# Patient Record
Sex: MALE | Race: WHITE | Employment: OTHER | URBAN - METROPOLITAN AREA
[De-identification: names, ages, dates, MRNs, and addresses within clinical notes are randomized per-mention and may not be internally consistent; named-entity substitution may affect disease eponyms.]

---

## 2024-08-20 ENCOUNTER — APPOINTMENT (OUTPATIENT)
Dept: GENERAL RADIOLOGY | Age: 69
End: 2024-08-20
Payer: MEDICARE

## 2024-08-20 ENCOUNTER — APPOINTMENT (OUTPATIENT)
Dept: CT IMAGING | Age: 69
End: 2024-08-20
Payer: MEDICARE

## 2024-08-20 ENCOUNTER — APPOINTMENT (OUTPATIENT)
Age: 69
End: 2024-08-20
Attending: STUDENT IN AN ORGANIZED HEALTH CARE EDUCATION/TRAINING PROGRAM
Payer: MEDICARE

## 2024-08-20 ENCOUNTER — HOSPITAL ENCOUNTER (INPATIENT)
Age: 69
LOS: 2 days | Discharge: HOME | End: 2024-08-22
Attending: STUDENT IN AN ORGANIZED HEALTH CARE EDUCATION/TRAINING PROGRAM | Admitting: INTERNAL MEDICINE
Payer: MEDICARE

## 2024-08-20 DIAGNOSIS — I46.9 CARDIAC ARREST (HCC): Primary | ICD-10-CM

## 2024-08-20 DIAGNOSIS — I49.01 VENTRICULAR FIBRILLATION (HCC): ICD-10-CM

## 2024-08-20 DIAGNOSIS — R46.89 COMBATIVE BEHAVIOR: ICD-10-CM

## 2024-08-20 LAB
ALBUMIN SERPL-MCNC: 3.9 G/DL (ref 3.4–5)
ALBUMIN SERPL-MCNC: 4.6 G/DL (ref 3.4–5)
ALBUMIN/GLOB SERPL: 1 {RATIO} (ref 1.1–2.2)
ALBUMIN/GLOB SERPL: 1.1 {RATIO} (ref 1.1–2.2)
ALP SERPL-CCNC: 125 U/L (ref 40–129)
ALP SERPL-CCNC: 154 U/L (ref 40–129)
ALT SERPL-CCNC: 30 U/L (ref 10–40)
ALT SERPL-CCNC: 35 U/L (ref 10–40)
AMPHETAMINES UR QL SCN>1000 NG/ML: ABNORMAL
ANION GAP SERPL CALCULATED.3IONS-SCNC: 17 MMOL/L (ref 3–16)
ANION GAP SERPL CALCULATED.3IONS-SCNC: 37 MMOL/L (ref 3–16)
APTT BLD: 36.1 SEC (ref 22.1–36.4)
AST SERPL-CCNC: 53 U/L (ref 15–37)
AST SERPL-CCNC: 71 U/L (ref 15–37)
BARBITURATES UR QL SCN>200 NG/ML: ABNORMAL
BASE EXCESS BLDA CALC-SCNC: -3 MMOL/L (ref -3–3)
BASE EXCESS BLDV CALC-SCNC: -12.3 MMOL/L (ref -3–3)
BASE EXCESS BLDV CALC-SCNC: -23.7 MMOL/L (ref -3–3)
BASOPHILS # BLD: 0 K/UL (ref 0–0.2)
BASOPHILS # BLD: 0 K/UL (ref 0–0.2)
BASOPHILS NFR BLD: 0 %
BASOPHILS NFR BLD: 0.1 %
BENZODIAZ UR QL SCN>200 NG/ML: ABNORMAL
BILIRUB SERPL-MCNC: 0.5 MG/DL (ref 0–1)
BILIRUB SERPL-MCNC: 1 MG/DL (ref 0–1)
BUN SERPL-MCNC: 26 MG/DL (ref 7–20)
BUN SERPL-MCNC: 32 MG/DL (ref 7–20)
CALCIUM SERPL-MCNC: 11.2 MG/DL (ref 8.3–10.6)
CALCIUM SERPL-MCNC: 8.9 MG/DL (ref 8.3–10.6)
CANNABINOIDS UR QL SCN>50 NG/ML: POSITIVE
CHLORIDE SERPL-SCNC: 100 MMOL/L (ref 99–110)
CHLORIDE SERPL-SCNC: 97 MMOL/L (ref 99–110)
CO2 BLDA-SCNC: 55.7 MMOL/L
CO2 BLDV-SCNC: 33 MMOL/L
CO2 BLDV-SCNC: 37 MMOL/L
CO2 SERPL-SCNC: 12 MMOL/L (ref 21–32)
CO2 SERPL-SCNC: 19 MMOL/L (ref 21–32)
COCAINE UR QL SCN: POSITIVE
COHGB MFR BLDA: 0.7 % (ref 0–1.5)
COHGB MFR BLDV: 2.2 % (ref 0–1.5)
COHGB MFR BLDV: 4.9 % (ref 0–1.5)
CREAT SERPL-MCNC: 1.5 MG/DL (ref 0.8–1.3)
CREAT SERPL-MCNC: 1.6 MG/DL (ref 0.8–1.3)
DEPRECATED RDW RBC AUTO: 13.7 % (ref 12.4–15.4)
DEPRECATED RDW RBC AUTO: 14.9 % (ref 12.4–15.4)
DO-HGB MFR BLDV: 50 %
DRUG SCREEN COMMENT UR-IMP: ABNORMAL
ECHO AV MEAN GRADIENT: 5 MMHG
ECHO AV MEAN VELOCITY: 1 M/S
ECHO AV PEAK GRADIENT: 11 MMHG
ECHO AV PEAK VELOCITY: 1.6 M/S
ECHO AV VELOCITY RATIO: 0.56
ECHO AV VTI: 23.6 CM
ECHO BSA: 2.05 M2
ECHO LV EDV A2C: 172 ML
ECHO LV EDV A4C: 204 ML
ECHO LV EDV INDEX A4C: 100 ML/M2
ECHO LV EDV NDEX A2C: 85 ML/M2
ECHO LV EJECTION FRACTION A2C: 16 %
ECHO LV EJECTION FRACTION A4C: 38 %
ECHO LV EJECTION FRACTION BIPLANE: 32 % (ref 55–100)
ECHO LV ESV A2C: 144 ML
ECHO LV ESV A4C: 128 ML
ECHO LV ESV INDEX A2C: 71 ML/M2
ECHO LV ESV INDEX A4C: 63 ML/M2
ECHO LVOT PEAK GRADIENT: 3 MMHG
ECHO LVOT PEAK VELOCITY: 0.9 M/S
EKG ATRIAL RATE: 109 BPM
EKG ATRIAL RATE: 148 BPM
EKG ATRIAL RATE: 86 BPM
EKG DIAGNOSIS: NORMAL
EKG P AXIS: 71 DEGREES
EKG P AXIS: 75 DEGREES
EKG P AXIS: 98 DEGREES
EKG P-R INTERVAL: 136 MS
EKG P-R INTERVAL: 192 MS
EKG P-R INTERVAL: 196 MS
EKG Q-T INTERVAL: 306 MS
EKG Q-T INTERVAL: 404 MS
EKG Q-T INTERVAL: 436 MS
EKG Q-T INTERVAL: 514 MS
EKG QRS DURATION: 166 MS
EKG QRS DURATION: 176 MS
EKG QRS DURATION: 184 MS
EKG QRS DURATION: 192 MS
EKG QTC CALCULATION (BAZETT): 480 MS
EKG QTC CALCULATION (BAZETT): 544 MS
EKG QTC CALCULATION (BAZETT): 615 MS
EKG QTC CALCULATION (BAZETT): 638 MS
EKG R AXIS: 85 DEGREES
EKG R AXIS: 89 DEGREES
EKG R AXIS: 90 DEGREES
EKG R AXIS: 91 DEGREES
EKG T AXIS: -78 DEGREES
EKG T AXIS: -81 DEGREES
EKG T AXIS: -87 DEGREES
EKG T AXIS: -88 DEGREES
EKG VENTRICULAR RATE: 109 BPM
EKG VENTRICULAR RATE: 129 BPM
EKG VENTRICULAR RATE: 148 BPM
EKG VENTRICULAR RATE: 86 BPM
EOSINOPHIL # BLD: 0 K/UL (ref 0–0.6)
EOSINOPHIL # BLD: 0.2 K/UL (ref 0–0.6)
EOSINOPHIL NFR BLD: 0.1 %
EOSINOPHIL NFR BLD: 1 %
ETHANOLAMINE SERPL-MCNC: NORMAL MG/DL (ref 0–0.08)
ETHANOLAMINE SERPL-MCNC: NORMAL MG/DL (ref 0–0.08)
FENTANYL SCREEN, URINE: ABNORMAL
GFR SERPLBLD CREATININE-BSD FMLA CKD-EPI: 47 ML/MIN/{1.73_M2}
GFR SERPLBLD CREATININE-BSD FMLA CKD-EPI: 51 ML/MIN/{1.73_M2}
GLUCOSE BLD-MCNC: 128 MG/DL (ref 70–99)
GLUCOSE SERPL-MCNC: 159 MG/DL (ref 70–99)
GLUCOSE SERPL-MCNC: 230 MG/DL (ref 70–99)
HCO3 BLDA-SCNC: 23.4 MMOL/L (ref 21–29)
HCO3 BLDV-SCNC: 13.7 MMOL/L (ref 23–29)
HCO3 BLDV-SCNC: 13.8 MMOL/L (ref 23–29)
HCT VFR BLD AUTO: 47.3 % (ref 40.5–52.5)
HCT VFR BLD AUTO: 54.9 % (ref 40.5–52.5)
HGB BLD-MCNC: 15.6 G/DL (ref 13.5–17.5)
HGB BLD-MCNC: 17.2 G/DL (ref 13.5–17.5)
HGB BLDA-MCNC: 15.5 G/DL (ref 13.5–17.5)
INR PPP: 1.18 (ref 0.85–1.15)
INR PPP: 1.27 (ref 0.85–1.15)
LACTATE BLDV-SCNC: 2.6 MMOL/L (ref 0.4–2)
LACTATE BLDV-SCNC: 24 MMOL/L (ref 0.4–2)
LIPASE SERPL-CCNC: 22 U/L (ref 13–60)
LYMPHOCYTES # BLD: 0.8 K/UL (ref 1–5.1)
LYMPHOCYTES # BLD: 6 K/UL (ref 1–5.1)
LYMPHOCYTES NFR BLD: 3.5 %
LYMPHOCYTES NFR BLD: 35 %
MCH RBC QN AUTO: 29.9 PG (ref 26–34)
MCH RBC QN AUTO: 30.5 PG (ref 26–34)
MCHC RBC AUTO-ENTMCNC: 31.3 G/DL (ref 31–36)
MCHC RBC AUTO-ENTMCNC: 33 G/DL (ref 31–36)
MCV RBC AUTO: 90.5 FL (ref 80–100)
MCV RBC AUTO: 97.5 FL (ref 80–100)
METHADONE UR QL SCN>300 NG/ML: ABNORMAL
METHGB MFR BLDA: 0.6 %
METHGB MFR BLDV: 0.5 %
METHGB MFR BLDV: 0.6 %
MONOCYTES # BLD: 1.9 K/UL (ref 0–1.3)
MONOCYTES # BLD: 2.1 K/UL (ref 0–1.3)
MONOCYTES NFR BLD: 11 %
MONOCYTES NFR BLD: 9.1 %
NEUTROPHILS # BLD: 19.8 K/UL (ref 1.7–7.7)
NEUTROPHILS # BLD: 9.1 K/UL (ref 1.7–7.7)
NEUTROPHILS NFR BLD: 53 %
NEUTROPHILS NFR BLD: 87.2 %
O2 CT VFR BLDV CALC: 12 VOL %
O2 CT VFR BLDV CALC: 20 VOL %
O2 THERAPY: ABNORMAL
OPIATES UR QL SCN>300 NG/ML: ABNORMAL
OXYCODONE UR QL SCN: ABNORMAL
PCO2 BLDA: 45.8 MMHG (ref 35–45)
PCO2 BLDV: 31.3 MMHG (ref 40–50)
PCO2 BLDV: 92.4 MMHG (ref 40–50)
PCP UR QL SCN>25 NG/ML: ABNORMAL
PERFORMED ON: ABNORMAL
PH BLDA: 7.32 [PH] (ref 7.35–7.45)
PH BLDV: 6.82 [PH] (ref 7.35–7.45)
PH BLDV: 7.25 [PH] (ref 7.35–7.45)
PH UR STRIP: 5 [PH]
PLATELET # BLD AUTO: 272 K/UL (ref 135–450)
PLATELET # BLD AUTO: 344 K/UL (ref 135–450)
PLATELET BLD QL SMEAR: ADEQUATE
PMV BLD AUTO: 7.6 FL (ref 5–10.5)
PMV BLD AUTO: 8.5 FL (ref 5–10.5)
PO2 BLDA: 219 MMHG (ref 75–108)
PO2 BLDV: 145 MMHG (ref 25–40)
PO2 BLDV: 49.5 MMHG (ref 25–40)
POTASSIUM SERPL-SCNC: 4.3 MMOL/L (ref 3.5–5.1)
POTASSIUM SERPL-SCNC: 4.9 MMOL/L (ref 3.5–5.1)
PROT SERPL-MCNC: 7.6 G/DL (ref 6.4–8.2)
PROT SERPL-MCNC: 9 G/DL (ref 6.4–8.2)
PROTHROMBIN TIME: 15.2 SEC (ref 11.9–14.9)
PROTHROMBIN TIME: 16.1 SEC (ref 11.9–14.9)
RBC # BLD AUTO: 5.23 M/UL (ref 4.2–5.9)
RBC # BLD AUTO: 5.64 M/UL (ref 4.2–5.9)
RBC MORPH BLD: NORMAL
REASON FOR REJECTION: NORMAL
REJECTED TEST: NORMAL
SAO2 % BLDA: 99.8 %
SAO2 % BLDV: 49 %
SAO2 % BLDV: 99 %
SLIDE REVIEW: ABNORMAL
SODIUM SERPL-SCNC: 136 MMOL/L (ref 136–145)
SODIUM SERPL-SCNC: 146 MMOL/L (ref 136–145)
TROPONIN, HIGH SENSITIVITY: 571 NG/L (ref 0–22)
TROPONIN, HIGH SENSITIVITY: 58 NG/L (ref 0–22)
WBC # BLD AUTO: 17.2 K/UL (ref 4–11)
WBC # BLD AUTO: 22.7 K/UL (ref 4–11)

## 2024-08-20 PROCEDURE — 96374 THER/PROPH/DIAG INJ IV PUSH: CPT

## 2024-08-20 PROCEDURE — 93010 ELECTROCARDIOGRAM REPORT: CPT | Performed by: INTERNAL MEDICINE

## 2024-08-20 PROCEDURE — 02HV33Z INSERTION OF INFUSION DEVICE INTO SUPERIOR VENA CAVA, PERCUTANEOUS APPROACH: ICD-10-PCS | Performed by: STUDENT IN AN ORGANIZED HEALTH CARE EDUCATION/TRAINING PROGRAM

## 2024-08-20 PROCEDURE — 85730 THROMBOPLASTIN TIME PARTIAL: CPT

## 2024-08-20 PROCEDURE — 83605 ASSAY OF LACTIC ACID: CPT

## 2024-08-20 PROCEDURE — 2000000000 HC ICU R&B

## 2024-08-20 PROCEDURE — 5A1935Z RESPIRATORY VENTILATION, LESS THAN 24 CONSECUTIVE HOURS: ICD-10-PCS | Performed by: STUDENT IN AN ORGANIZED HEALTH CARE EDUCATION/TRAINING PROGRAM

## 2024-08-20 PROCEDURE — 2500000003 HC RX 250 WO HCPCS: Performed by: STUDENT IN AN ORGANIZED HEALTH CARE EDUCATION/TRAINING PROGRAM

## 2024-08-20 PROCEDURE — 80053 COMPREHEN METABOLIC PANEL: CPT

## 2024-08-20 PROCEDURE — 96365 THER/PROPH/DIAG IV INF INIT: CPT

## 2024-08-20 PROCEDURE — 82077 ASSAY SPEC XCP UR&BREATH IA: CPT

## 2024-08-20 PROCEDURE — 80307 DRUG TEST PRSMV CHEM ANLYZR: CPT

## 2024-08-20 PROCEDURE — 2580000003 HC RX 258: Performed by: STUDENT IN AN ORGANIZED HEALTH CARE EDUCATION/TRAINING PROGRAM

## 2024-08-20 PROCEDURE — 70450 CT HEAD/BRAIN W/O DYE: CPT

## 2024-08-20 PROCEDURE — 2580000003 HC RX 258: Performed by: INTERNAL MEDICINE

## 2024-08-20 PROCEDURE — 31500 INSERT EMERGENCY AIRWAY: CPT

## 2024-08-20 PROCEDURE — C1751 CATH, INF, PER/CENT/MIDLINE: HCPCS

## 2024-08-20 PROCEDURE — 71045 X-RAY EXAM CHEST 1 VIEW: CPT

## 2024-08-20 PROCEDURE — 83036 HEMOGLOBIN GLYCOSYLATED A1C: CPT

## 2024-08-20 PROCEDURE — 94761 N-INVAS EAR/PLS OXIMETRY MLT: CPT

## 2024-08-20 PROCEDURE — 99285 EMERGENCY DEPT VISIT HI MDM: CPT

## 2024-08-20 PROCEDURE — 83690 ASSAY OF LIPASE: CPT

## 2024-08-20 PROCEDURE — 0BH17EZ INSERTION OF ENDOTRACHEAL AIRWAY INTO TRACHEA, VIA NATURAL OR ARTIFICIAL OPENING: ICD-10-PCS | Performed by: STUDENT IN AN ORGANIZED HEALTH CARE EDUCATION/TRAINING PROGRAM

## 2024-08-20 PROCEDURE — 5A12012 PERFORMANCE OF CARDIAC OUTPUT, SINGLE, MANUAL: ICD-10-PCS | Performed by: STUDENT IN AN ORGANIZED HEALTH CARE EDUCATION/TRAINING PROGRAM

## 2024-08-20 PROCEDURE — 93308 TTE F-UP OR LMTD: CPT

## 2024-08-20 PROCEDURE — 6360000002 HC RX W HCPCS: Performed by: STUDENT IN AN ORGANIZED HEALTH CARE EDUCATION/TRAINING PROGRAM

## 2024-08-20 PROCEDURE — 2700000000 HC OXYGEN THERAPY PER DAY

## 2024-08-20 PROCEDURE — 84484 ASSAY OF TROPONIN QUANT: CPT

## 2024-08-20 PROCEDURE — 36415 COLL VENOUS BLD VENIPUNCTURE: CPT

## 2024-08-20 PROCEDURE — 93325 DOPPLER ECHO COLOR FLOW MAPG: CPT | Performed by: INTERNAL MEDICINE

## 2024-08-20 PROCEDURE — 82803 BLOOD GASES ANY COMBINATION: CPT

## 2024-08-20 PROCEDURE — 93308 TTE F-UP OR LMTD: CPT | Performed by: INTERNAL MEDICINE

## 2024-08-20 PROCEDURE — 99291 CRITICAL CARE FIRST HOUR: CPT | Performed by: STUDENT IN AN ORGANIZED HEALTH CARE EDUCATION/TRAINING PROGRAM

## 2024-08-20 PROCEDURE — 93005 ELECTROCARDIOGRAM TRACING: CPT | Performed by: STUDENT IN AN ORGANIZED HEALTH CARE EDUCATION/TRAINING PROGRAM

## 2024-08-20 PROCEDURE — 85025 COMPLETE CBC W/AUTO DIFF WBC: CPT

## 2024-08-20 PROCEDURE — 94002 VENT MGMT INPAT INIT DAY: CPT

## 2024-08-20 PROCEDURE — 93321 DOPPLER ECHO F-UP/LMTD STD: CPT | Performed by: INTERNAL MEDICINE

## 2024-08-20 PROCEDURE — 96366 THER/PROPH/DIAG IV INF ADDON: CPT

## 2024-08-20 PROCEDURE — 36569 INSJ PICC 5 YR+ W/O IMAGING: CPT

## 2024-08-20 PROCEDURE — 96375 TX/PRO/DX INJ NEW DRUG ADDON: CPT

## 2024-08-20 PROCEDURE — 85610 PROTHROMBIN TIME: CPT

## 2024-08-20 PROCEDURE — 6360000002 HC RX W HCPCS

## 2024-08-20 RX ORDER — 0.9 % SODIUM CHLORIDE 0.9 %
500 INTRAVENOUS SOLUTION INTRAVENOUS ONCE
Status: COMPLETED | OUTPATIENT
Start: 2024-08-20 | End: 2024-08-20

## 2024-08-20 RX ORDER — ENOXAPARIN SODIUM 100 MG/ML
40 INJECTION SUBCUTANEOUS DAILY
Status: DISCONTINUED | OUTPATIENT
Start: 2024-08-20 | End: 2024-08-20

## 2024-08-20 RX ORDER — LIDOCAINE HYDROCHLORIDE 10 MG/ML
50 INJECTION, SOLUTION EPIDURAL; INFILTRATION; INTRACAUDAL; PERINEURAL ONCE
Status: DISCONTINUED | OUTPATIENT
Start: 2024-08-20 | End: 2024-08-22

## 2024-08-20 RX ORDER — ROCURONIUM BROMIDE 10 MG/ML
80 INJECTION, SOLUTION INTRAVENOUS ONCE
Status: COMPLETED | OUTPATIENT
Start: 2024-08-20 | End: 2024-08-20

## 2024-08-20 RX ORDER — SODIUM CHLORIDE 0.9 % (FLUSH) 0.9 %
5-40 SYRINGE (ML) INJECTION PRN
Status: DISCONTINUED | OUTPATIENT
Start: 2024-08-20 | End: 2024-08-22 | Stop reason: HOSPADM

## 2024-08-20 RX ORDER — GLUCAGON 1 MG/ML
1 KIT INJECTION PRN
Status: DISCONTINUED | OUTPATIENT
Start: 2024-08-20 | End: 2024-08-22 | Stop reason: HOSPADM

## 2024-08-20 RX ORDER — DIPHENHYDRAMINE HYDROCHLORIDE 50 MG/ML
INJECTION INTRAMUSCULAR; INTRAVENOUS
Status: COMPLETED
Start: 2024-08-20 | End: 2024-08-20

## 2024-08-20 RX ORDER — POLYETHYLENE GLYCOL 3350 17 G/17G
17 POWDER, FOR SOLUTION ORAL DAILY PRN
Status: DISCONTINUED | OUTPATIENT
Start: 2024-08-20 | End: 2024-08-22 | Stop reason: HOSPADM

## 2024-08-20 RX ORDER — HEPARIN SODIUM 1000 [USP'U]/ML
2000 INJECTION, SOLUTION INTRAVENOUS; SUBCUTANEOUS PRN
Status: DISCONTINUED | OUTPATIENT
Start: 2024-08-20 | End: 2024-08-22 | Stop reason: HOSPADM

## 2024-08-20 RX ORDER — HEPARIN SODIUM 1000 [USP'U]/ML
4000 INJECTION, SOLUTION INTRAVENOUS; SUBCUTANEOUS ONCE
Status: COMPLETED | OUTPATIENT
Start: 2024-08-20 | End: 2024-08-20

## 2024-08-20 RX ORDER — ACETAMINOPHEN 325 MG/1
650 TABLET ORAL EVERY 6 HOURS PRN
Status: DISCONTINUED | OUTPATIENT
Start: 2024-08-20 | End: 2024-08-22 | Stop reason: HOSPADM

## 2024-08-20 RX ORDER — INSULIN LISPRO 100 [IU]/ML
0-4 INJECTION, SOLUTION INTRAVENOUS; SUBCUTANEOUS NIGHTLY
Status: DISCONTINUED | OUTPATIENT
Start: 2024-08-20 | End: 2024-08-21 | Stop reason: DRUGHIGH

## 2024-08-20 RX ORDER — INSULIN LISPRO 100 [IU]/ML
0-8 INJECTION, SOLUTION INTRAVENOUS; SUBCUTANEOUS
Status: DISCONTINUED | OUTPATIENT
Start: 2024-08-20 | End: 2024-08-21

## 2024-08-20 RX ORDER — HEPARIN SODIUM 1000 [USP'U]/ML
4000 INJECTION, SOLUTION INTRAVENOUS; SUBCUTANEOUS PRN
Status: DISCONTINUED | OUTPATIENT
Start: 2024-08-20 | End: 2024-08-22 | Stop reason: HOSPADM

## 2024-08-20 RX ORDER — HALOPERIDOL 5 MG/ML
5 INJECTION INTRAMUSCULAR ONCE
Status: COMPLETED | OUTPATIENT
Start: 2024-08-20 | End: 2024-08-20

## 2024-08-20 RX ORDER — ACETAMINOPHEN 650 MG/1
650 SUPPOSITORY RECTAL EVERY 6 HOURS PRN
Status: DISCONTINUED | OUTPATIENT
Start: 2024-08-20 | End: 2024-08-22 | Stop reason: HOSPADM

## 2024-08-20 RX ORDER — SODIUM CHLORIDE 9 MG/ML
INJECTION, SOLUTION INTRAVENOUS PRN
Status: DISCONTINUED | OUTPATIENT
Start: 2024-08-20 | End: 2024-08-22 | Stop reason: HOSPADM

## 2024-08-20 RX ORDER — SODIUM CHLORIDE 0.9 % (FLUSH) 0.9 %
5-40 SYRINGE (ML) INJECTION EVERY 12 HOURS SCHEDULED
Status: DISCONTINUED | OUTPATIENT
Start: 2024-08-20 | End: 2024-08-22 | Stop reason: HOSPADM

## 2024-08-20 RX ORDER — KETAMINE HYDROCHLORIDE 100 MG/ML
INJECTION, SOLUTION INTRAMUSCULAR; INTRAVENOUS
Status: DISCONTINUED
Start: 2024-08-20 | End: 2024-08-20 | Stop reason: WASHOUT

## 2024-08-20 RX ORDER — NOREPINEPHRINE BITARTRATE 0.06 MG/ML
1-100 INJECTION, SOLUTION INTRAVENOUS CONTINUOUS
Status: DISCONTINUED | OUTPATIENT
Start: 2024-08-20 | End: 2024-08-22 | Stop reason: HOSPADM

## 2024-08-20 RX ORDER — FENTANYL CITRATE 50 UG/ML
75 INJECTION, SOLUTION INTRAMUSCULAR; INTRAVENOUS ONCE
Status: COMPLETED | OUTPATIENT
Start: 2024-08-20 | End: 2024-08-20

## 2024-08-20 RX ORDER — DIPHENHYDRAMINE HYDROCHLORIDE 50 MG/ML
50 INJECTION INTRAMUSCULAR; INTRAVENOUS ONCE
Status: COMPLETED | OUTPATIENT
Start: 2024-08-20 | End: 2024-08-20

## 2024-08-20 RX ORDER — GAUZE BANDAGE 2" X 2"
100 BANDAGE TOPICAL DAILY
Status: DISCONTINUED | OUTPATIENT
Start: 2024-08-20 | End: 2024-08-22 | Stop reason: HOSPADM

## 2024-08-20 RX ORDER — HEPARIN SODIUM 10000 [USP'U]/100ML
5-30 INJECTION, SOLUTION INTRAVENOUS CONTINUOUS
Status: DISCONTINUED | OUTPATIENT
Start: 2024-08-20 | End: 2024-08-22 | Stop reason: HOSPADM

## 2024-08-20 RX ORDER — DEXTROSE MONOHYDRATE 100 MG/ML
INJECTION, SOLUTION INTRAVENOUS CONTINUOUS PRN
Status: DISCONTINUED | OUTPATIENT
Start: 2024-08-20 | End: 2024-08-22 | Stop reason: HOSPADM

## 2024-08-20 RX ORDER — ASPIRIN 81 MG/1
81 TABLET, CHEWABLE ORAL DAILY
Status: DISCONTINUED | OUTPATIENT
Start: 2024-08-21 | End: 2024-08-22 | Stop reason: HOSPADM

## 2024-08-20 RX ORDER — PROPOFOL 10 MG/ML
5-50 INJECTION, EMULSION INTRAVENOUS CONTINUOUS
Status: DISCONTINUED | OUTPATIENT
Start: 2024-08-20 | End: 2024-08-22

## 2024-08-20 RX ORDER — ASPIRIN 81 MG/1
324 TABLET, CHEWABLE ORAL ONCE
Status: DISCONTINUED | OUTPATIENT
Start: 2024-08-20 | End: 2024-08-21

## 2024-08-20 RX ORDER — FENTANYL CITRATE-0.9 % NACL/PF 10 MCG/ML
25-200 PLASTIC BAG, INJECTION (ML) INTRAVENOUS CONTINUOUS
Status: DISCONTINUED | OUTPATIENT
Start: 2024-08-20 | End: 2024-08-22

## 2024-08-20 RX ADMIN — PROPOFOL 20 MG: 10 INJECTION, EMULSION INTRAVENOUS at 13:34

## 2024-08-20 RX ADMIN — DEXTROSE MONOHYDRATE 150 MG: 50 INJECTION, SOLUTION INTRAVENOUS at 14:08

## 2024-08-20 RX ADMIN — Medication 50 MCG/HR: at 15:50

## 2024-08-20 RX ADMIN — HALOPERIDOL LACTATE 5 MG: 5 INJECTION, SOLUTION INTRAMUSCULAR at 13:13

## 2024-08-20 RX ADMIN — SODIUM CHLORIDE 500 ML: 9 INJECTION, SOLUTION INTRAVENOUS at 17:28

## 2024-08-20 RX ADMIN — AMIODARONE HYDROCHLORIDE 0.5 MG/MIN: 50 INJECTION, SOLUTION INTRAVENOUS at 20:32

## 2024-08-20 RX ADMIN — ROCURONIUM BROMIDE 80 MG: 10 INJECTION, SOLUTION INTRAVENOUS at 13:47

## 2024-08-20 RX ADMIN — PROPOFOL 20 MG: 10 INJECTION, EMULSION INTRAVENOUS at 13:19

## 2024-08-20 RX ADMIN — AMIODARONE HYDROCHLORIDE 1 MG/MIN: 50 INJECTION, SOLUTION INTRAVENOUS at 14:24

## 2024-08-20 RX ADMIN — SODIUM CHLORIDE, PRESERVATIVE FREE 10 ML: 5 INJECTION INTRAVENOUS at 20:29

## 2024-08-20 RX ADMIN — SODIUM CHLORIDE, PRESERVATIVE FREE 10 ML: 5 INJECTION INTRAVENOUS at 20:28

## 2024-08-20 RX ADMIN — Medication 5 MCG/MIN: at 17:59

## 2024-08-20 RX ADMIN — SODIUM CHLORIDE 2 MG: 9 INJECTION INTRAMUSCULAR; INTRAVENOUS; SUBCUTANEOUS at 13:10

## 2024-08-20 RX ADMIN — HEPARIN SODIUM 4000 UNITS: 1000 INJECTION INTRAVENOUS; SUBCUTANEOUS at 18:30

## 2024-08-20 RX ADMIN — PROPOFOL 25 MCG/KG/MIN: 10 INJECTION, EMULSION INTRAVENOUS at 23:44

## 2024-08-20 RX ADMIN — FENTANYL CITRATE 75 MCG: 0.05 INJECTION, SOLUTION INTRAMUSCULAR; INTRAVENOUS at 14:21

## 2024-08-20 RX ADMIN — HEPARIN SODIUM 11 UNITS/KG/HR: 10000 INJECTION, SOLUTION INTRAVENOUS at 18:33

## 2024-08-20 RX ADMIN — DIPHENHYDRAMINE HYDROCHLORIDE 50 MG: 50 INJECTION INTRAMUSCULAR; INTRAVENOUS at 13:15

## 2024-08-20 RX ADMIN — PROPOFOL 35 MCG/KG/MIN: 10 INJECTION, EMULSION INTRAVENOUS at 17:32

## 2024-08-20 RX ADMIN — PROPOFOL 20 MG: 10 INJECTION, EMULSION INTRAVENOUS at 13:25

## 2024-08-20 RX ADMIN — PROPOFOL 50 MCG/KG/MIN: 10 INJECTION, EMULSION INTRAVENOUS at 13:56

## 2024-08-20 RX ADMIN — PROPOFOL 100 MG: 10 INJECTION, EMULSION INTRAVENOUS at 13:47

## 2024-08-20 RX ADMIN — SODIUM BICARBONATE 50 MEQ: 84 INJECTION INTRAVENOUS at 14:43

## 2024-08-20 ASSESSMENT — PULMONARY FUNCTION TESTS
PIF_VALUE: 18
PIF_VALUE: 17
PIF_VALUE: 18

## 2024-08-20 ASSESSMENT — PAIN - FUNCTIONAL ASSESSMENT: PAIN_FUNCTIONAL_ASSESSMENT: 0-10

## 2024-08-20 ASSESSMENT — PAIN SCALES - GENERAL
PAINLEVEL_OUTOF10: 0

## 2024-08-20 ASSESSMENT — LIFESTYLE VARIABLES
HOW OFTEN DO YOU HAVE A DRINK CONTAINING ALCOHOL: PATIENT UNABLE TO ANSWER
HOW MANY STANDARD DRINKS CONTAINING ALCOHOL DO YOU HAVE ON A TYPICAL DAY: PATIENT UNABLE TO ANSWER

## 2024-08-20 NOTE — ED NOTES
Patient transported to CVU with this RN, Gabby RN, and RT. Patient transported on portable ventilator, ambu bag, medications infusing per MAR, and telemetry.

## 2024-08-20 NOTE — ED NOTES
How does patient ambulate?   []Low Fall Risk (ambulates by themselves without support)  []Stand by assist   []Contact Guard   []Front wheel walker  []Wheelchair   []Steady  []Bed bound  []History of Lower Extremity Amputation  [x]Unknown, did not assess in the emergency department   How does patient take pills?  []Whole with Water  []Crushed in applesauce  []Crushed in pudding  []Other  [x]Unknown no oral medications were given in the ED  Is patient alert?   []Alert  []Drowsy but responds to voice  []Doesn't respond to voice but responds to painful stimuli  [x]Unresponsive  Is patient oriented?   []To person  []To place  []To time  []To situation  []Confused  [x]Agitated  []Follows commands  If patient is disoriented or from a Skill Nursing Facility has family been notified of admission?   []Yes   [x]No  Patient belongings?   []Cell phone  []Wallet   []Dentures  [x]Clothing  Any specific patient or family belongings/needs/dynamics?   N/A   Miscellaneous comments/pending orders?  ED orders completed at this time.      If there are any additional questions please reach out to the Emergency Department.

## 2024-08-20 NOTE — ED PROVIDER NOTES
[facility] EMERGENCY DEPARTMENT  EMERGENCY DEPARTMENT ENCOUNTER      Pt Name: Juan Rodriguez  MRN: [id number]  Birthdate 1955  Date of evaluation: 8/20/2024  Provider: Ga Dalal MD    CHIEF COMPLAINT       Chief Complaint   Patient presents with    Cardiac Arrest     Pt brought in per [facility] from home, per neighbor he heard pt collapse and hit the ground in the garage, EMS arrived and found the pt to be unresponsive in v-fib, pt shocked x 2 at 360 j, epi x 2, they obtained ROSC, pt combative after ROSC.  Pt arrived awake, yelling, cursing, combative, kicking.           HISTORY OF PRESENT ILLNESS   (Location/Symptom, Timing/Onset, Context/Setting, Quality, Duration, Modifying Factors, Severity)  Note limiting factors.   Juan Rodriguez is a 68 y.o. male who presents to the emergency department by EMS for cardiac arrest.  EMS reports that neighbors called when they saw the patient fall while outside working on his car.  He did not receive bystander CPR.  EMS arrived and found the patient to be unresponsive and V-fib.  He received chest compressions and defibrillation x 2 with epi x 2.  Patient achieved ROSC upon arrival to the ER.  Patient was very combative upon obtaining ROSC, noncooperative and agitated.  EMS did not get any history from bystanders.  Additional history provided by friends and family at bedside, patient does not seek routine medical care.  They are uncertain of any medical problems.    Nursing Notes were reviewed.    REVIEW OF SYSTEMS    (2-9 systems for level 4, 10 or more for level 5)     Review of Systems    Except as noted above the remainder of the review of systems was reviewed and negative.       PAST MEDICAL HISTORY   No past medical history on file.      SURGICAL HISTORY     No past surgical history on file.      CURRENT MEDICATIONS       Previous Medications    No medications on file       ALLERGIES     Patient has no known allergies.    FAMILY HISTORY     No  preliminarily interpreted by the emergency physician with the below findings:        Interpretation per the Radiologist below, if available at the time of this note:    CT HEAD WO CONTRAST   Final Result   1. No acute intracranial abnormality.         XR CHEST PORTABLE   Final Result   No consolidation or venous congestion.               LABS:  Labs Reviewed   CBC WITH AUTO DIFFERENTIAL - Abnormal; Notable for the following components:       Result Value    WBC 17.2 (*)     Hematocrit 54.9 (*)     Neutrophils Absolute 9.1 (*)     Lymphocytes Absolute 6.0 (*)     Monocytes Absolute 1.9 (*)     All other components within normal limits   COMPREHENSIVE METABOLIC PANEL W/ REFLEX TO MG FOR LOW K - Abnormal; Notable for the following components:    Sodium 146 (*)     Chloride 97 (*)     CO2 12 (*)     Anion Gap 37 (*)     Glucose 230 (*)     BUN 26 (*)     Creatinine 1.5 (*)     Est, Glom Filt Rate 51 (*)     Calcium 11.2 (*)     Total Protein 9.0 (*)     Albumin/Globulin Ratio 1.0 (*)     Alkaline Phosphatase 154 (*)     AST 71 (*)     All other components within normal limits    Narrative:     CALL  Gardner  [facility] tel. [phone],  Chemistry results called to and read back by SHELTON Freeman, 08/20/2024 14:11,  by Viddyad   PROTIME-INR - Abnormal; Notable for the following components:    Protime 16.1 (*)     INR 1.27 (*)     All other components within normal limits   TROPONIN - Abnormal; Notable for the following components:    Troponin, High Sensitivity 58 (*)     All other components within normal limits    Narrative:     CALL  Gardner  [facility] tel. [phone],  Chemistry results called to and read back by SHELTON Freeman, 08/20/2024 14:11,  by Viddyad   BLOOD GAS, VENOUS - Abnormal; Notable for the following components:    pH, Ahmet 6.818 (*)     pCO2, Ahmet 92.4 (*)     PO2, Ahmet 49.5 (*)     HCO3, Venous 13.8 (*)     Base Excess, Ahmet -23.7 (*)     Carboxyhemoglobin 2.2 (*)     All other components within normal limits    Narrative:   MD  08/20/24 1537       Ga Dalal MD  08/20/24 3867       Ga Dalal MD  08/21/24 1206

## 2024-08-20 NOTE — PROGRESS NOTES
Patient seen in ED, room 02.  Admission completed with the following exceptions:  4 Eyes Assessment, Immunizations, Vaccines, Rights and Responsibilities, Orientation to room, Plan of Care, Education/Learning Assessment and Education Plan, white board, height and weight, pain assessment and head to toe assessment.  Patient is intubated and his admission was entered based on information provided by his sister, Kamilah Nuñez.  Patient lives alone in a bi-level house and is being admitted for Cardiac arrest with ventricular fibrillation.  He takes no home medications as he does not go to a physician.  Unknown if he takes any over the counter medications.  A friend at the bedside stated that if he had a headache or aches and pains, he would probably have more liquor.  All family and friends questions answered.

## 2024-08-20 NOTE — H&P
HOSPITALISTS HISTORY AND PHYSICAL    8/20/2024 3:54 PM    Patient Information:  ROCHELLE RODRIGUEZ is a 68 y.o. male [id number]  PCP:  No primary care provider on file. (Tel: None )    Chief complaint:    Chief Complaint   Patient presents with    Cardiac Arrest     Pt brought in per [facility] from home, per neighbor he heard pt collapse and hit the ground in the garage, EMS arrived and found the pt to be unresponsive in v-fib, pt shocked x 2 at 360 j, epi x 2, they obtained ROSC, pt combative after ROSC.  Pt arrived awake, yelling, cursing, combative, kicking.         History of Present Illness:  Rochelle Rodriguez is a 68 y.o. male who had neighbors call when the patient fell down while working out in his car he did not receive bystander CPR EMS arrived and found the patient to be responsive in V-fib patient received chest comfortably and defibrillation x 2 with epi x 2 was changed ROSC roughly in 12 minutes patient was very combative upon obtaining ROSC noncooperative agitated EKG on arrival did show wide-complex tachycardia and patient was given a cardioversion patient required sedation intubation due to severe agitation had a CT head which was negative for intracranial hemorrhage.  Family was in the room and saw the patient patient drinks 10+ beers every day does not smoke has not seen a doctor in many years no prior medical history per the family      REVIEW OF SYSTEMS:   Intubated unable to answer    Past Medical History:  Etoh abuse      Medications:  No current facility-administered medications on file prior to encounter.     No current outpatient medications on file prior to encounter.       Allergies:  No Known Allergies     Social History:  Patient Lives at home   reports that he has never smoked. He has never used smokeless tobacco. He reports current alcohol use of about 2.0 standard drinks of alcohol per week. He  abg  Propofl  Tele  Critical care and cardiolgoy consult    Lactic acidosis repeat level    Respiratory and metabolic acidosis  Repeat abg  Get repeat lfts    Hyperglycemia check A1c    Etoh abuse monitor for withdrawal  thimaine    DVT prophylaxis lovenox  Code status full code    I spent 31 minutes providing critical care services to this patient thus far today      Admit as inpatient I anticipate hospitalization spanning more than two midnights for investigation and treatment of the above medically necessary diagnoses.    Please note that some part of this chart was generated using Dragon dictation software. Although every effort was made to ensure the accuracy of this automated transcription, some errors in transcription may have occurred inadvertently. If you may need any clarification, please do not hesitate to contact me through EPIC.       Forrest Lo MD    8/20/2024 3:54 PM

## 2024-08-20 NOTE — ED NOTES
Neighbors here, Dr Dalal spoke with them, has one sister they are unable to locate  No other family  No PMH, does not go to the .

## 2024-08-20 NOTE — ED TRIAGE NOTES
Pt arrives combative, kicking, yelling, cursing  Pt was found unresponsive in his garage by a neighbor  911 called, EMS found pt to be in v-fib  Shocked by EMS x 2 at 360 J  Epi x 2 given  Pt had to be restrained x 4 upon arrival to ER

## 2024-08-20 NOTE — CONSULTS
[facility]    2024    Juan Rodriguez (:  1955) is a 68 y.o. male    Referring Provider: No primary care provider on file.    HISTORY:  Mr. Rodriguez is a 68 y.o. male who presented to the emergency department. He was found unresponsive in his garage by a neighbor. By family report, this neighbor has dementia and is not always the most reliable, but believe 911 was called quickly. EMS found him to be in v-fib. There was report of convulsions and eyes rolling back with eyelids open. He was shocked x2 at 360J and epi was given twice. Upon arrival to the emergency room, he had to be restrained x4 because he was combative, kicking, yelling, and cursing. His rhythm was reportedly narrow initially (no EKG or tele to show) but changed to a wide complex tachycardia at 1332 with synchronized cardioversion at 200J. He remained in wide complex tachycardia. He was loaded with an amiodarone bolus and started on an amio gtt. Defib was unsuccessful though his heart rate did slow down and showed more obvious p wave morphology. Pt intubated and sedated. I did assess him just prior to intubation. He was incoherent, not following commands, combative, moving all extremities, and thrashing about.     Urine on his shorts.     His sister reports that he has no major medical history. She remembers him having a seizure in high school. She admits that he drinks daily (12+ beers, possibly more) and is still working. He has had no major complaints recently. It is unclear if he decided to stop drinking, but family reports he always has a beer in hand for 30+ years.    Bedside echo attempted prior to intubating with significant pain to subcostal views.     Takes no medications and has not seen a doctor in years. No reported hx otherwise.     ECHO tech called for stat limited ECHO - report pending. Per my bedside eval, mostly global hypokinesis with LVEF ~30% in limited views. LBBB makes WMA difficult, but no clear regional  starting heparin, but if significant troponin elevation would consider adding.      Due to clinically significant life threating conditions a total critical care time of >35minutes was used. This includes but is not limited to data, imaging, records review and direct patient care sxcluding time spent performing procedures.     An  electronic signature was used to authenticate this note.    Rickie Cummings MD on 8/20/2024 at 3:27 PM    Scribe's Attestation: This note was scribed in the presence of Dr. Rickie Cummings MD by Corie Mendoza RN

## 2024-08-20 NOTE — PROGRESS NOTES
ENDOTRACHEAL INTUBATION PROCEDURE NOTE    Name:  Juan Rodriguez  Medical Record Number:  [id number]  Age: 68 y.o.   Gender: male  : 1955  Today's Date:  2024  Room:  [facility]    Equipment: Glide Scope  Blade Size: 4  Cricoid Pressure: No   Number of Attempts: 1  Size 7.5mm ET tube placed through the vocal chords to 24 cm at the lip.   ETT location confirmed by auscultation and EtCO2 monitor.  Post Intubation CXR Ordered:  Yes   Dr. Dalal at the bedside during the procedure    Electronically signed by GWEN Pearce RCP on 24 at 2:08 PM EDT

## 2024-08-20 NOTE — CONSULTS
PICC line education:    -Risks  -Benefits  -Alternatives  -Procedure    Discussed the above with patient, verbalized understanding, answered all questions. Provided with information on PICC care to review. PICC tip verified via 3CG (Ok to use). Reported off to patient's  Nurse Ana.

## 2024-08-21 PROBLEM — Z99.11 ON MECHANICALLY ASSISTED VENTILATION (HCC): Status: ACTIVE | Noted: 2024-08-21

## 2024-08-21 PROBLEM — F10.931 ALCOHOL WITHDRAWAL SYNDROME, WITH DELIRIUM (HCC): Status: ACTIVE | Noted: 2024-08-21

## 2024-08-21 PROBLEM — N17.9 AKI (ACUTE KIDNEY INJURY) (HCC): Status: ACTIVE | Noted: 2024-08-21

## 2024-08-21 PROBLEM — F19.10 POLYSUBSTANCE ABUSE (HCC): Status: ACTIVE | Noted: 2024-08-21

## 2024-08-21 LAB
ALBUMIN SERPL-MCNC: 3.5 G/DL (ref 3.4–5)
ALP SERPL-CCNC: 106 U/L (ref 40–129)
ALT SERPL-CCNC: 27 U/L (ref 10–40)
ANION GAP SERPL CALCULATED.3IONS-SCNC: 10 MMOL/L (ref 3–16)
ANTI-XA UNFRAC HEPARIN: 0.11 IU/ML (ref 0.3–0.7)
ANTI-XA UNFRAC HEPARIN: 0.21 IU/ML (ref 0.3–0.7)
ANTI-XA UNFRAC HEPARIN: 0.25 IU/ML (ref 0.3–0.7)
ANTI-XA UNFRAC HEPARIN: 0.31 IU/ML (ref 0.3–0.7)
AST SERPL-CCNC: 47 U/L (ref 15–37)
BASE EXCESS BLDA CALC-SCNC: -1.1 MMOL/L (ref -3–3)
BASOPHILS # BLD: 0 K/UL (ref 0–0.2)
BASOPHILS NFR BLD: 0.3 %
BILIRUB DIRECT SERPL-MCNC: 0.2 MG/DL (ref 0–0.3)
BILIRUB INDIRECT SERPL-MCNC: 0.3 MG/DL (ref 0–1)
BILIRUB SERPL-MCNC: 0.5 MG/DL (ref 0–1)
BUN SERPL-MCNC: 33 MG/DL (ref 7–20)
CALCIUM SERPL-MCNC: 8.7 MG/DL (ref 8.3–10.6)
CHLORIDE SERPL-SCNC: 102 MMOL/L (ref 99–110)
CO2 BLDA-SCNC: 59.8 MMOL/L
CO2 SERPL-SCNC: 23 MMOL/L (ref 21–32)
COHGB MFR BLDA: 1 % (ref 0–1.5)
CREAT SERPL-MCNC: 1.6 MG/DL (ref 0.8–1.3)
DEPRECATED RDW RBC AUTO: 13.8 % (ref 12.4–15.4)
EOSINOPHIL # BLD: 0 K/UL (ref 0–0.6)
EOSINOPHIL NFR BLD: 0.4 %
EST. AVERAGE GLUCOSE BLD GHB EST-MCNC: 99.7 MG/DL
GFR SERPLBLD CREATININE-BSD FMLA CKD-EPI: 47 ML/MIN/{1.73_M2}
GLUCOSE BLD-MCNC: 100 MG/DL (ref 70–99)
GLUCOSE BLD-MCNC: 101 MG/DL (ref 70–99)
GLUCOSE BLD-MCNC: 108 MG/DL (ref 70–99)
GLUCOSE SERPL-MCNC: 111 MG/DL (ref 70–99)
HBA1C MFR BLD: 5.1 %
HCO3 BLDA-SCNC: 25.2 MMOL/L (ref 21–29)
HCT VFR BLD AUTO: 41.7 % (ref 40.5–52.5)
HGB BLD-MCNC: 14.1 G/DL (ref 13.5–17.5)
HGB BLDA-MCNC: 14.8 G/DL (ref 13.5–17.5)
LACTATE BLDV-SCNC: 0.8 MMOL/L (ref 0.4–2)
LYMPHOCYTES # BLD: 1.6 K/UL (ref 1–5.1)
LYMPHOCYTES NFR BLD: 12.8 %
MCH RBC QN AUTO: 30.7 PG (ref 26–34)
MCHC RBC AUTO-ENTMCNC: 33.9 G/DL (ref 31–36)
MCV RBC AUTO: 90.5 FL (ref 80–100)
METHGB MFR BLDA: 0.5 %
MONOCYTES # BLD: 1.4 K/UL (ref 0–1.3)
MONOCYTES NFR BLD: 11.2 %
NEUTROPHILS # BLD: 9.7 K/UL (ref 1.7–7.7)
NEUTROPHILS NFR BLD: 75.3 %
O2 THERAPY: ABNORMAL
PATH INTERP BLD-IMP: NORMAL
PCO2 BLDA: 47.2 MMHG (ref 35–45)
PERFORMED ON: ABNORMAL
PH BLDA: 7.34 [PH] (ref 7.35–7.45)
PLATELET # BLD AUTO: 263 K/UL (ref 135–450)
PMV BLD AUTO: 7.2 FL (ref 5–10.5)
PO2 BLDA: 146 MMHG (ref 75–108)
POTASSIUM SERPL-SCNC: 4.1 MMOL/L (ref 3.5–5.1)
PROT SERPL-MCNC: 6.8 G/DL (ref 6.4–8.2)
RBC # BLD AUTO: 4.6 M/UL (ref 4.2–5.9)
SAO2 % BLDA: 99.6 %
SODIUM SERPL-SCNC: 135 MMOL/L (ref 136–145)
TROPONIN, HIGH SENSITIVITY: 260 NG/L (ref 0–22)
WBC # BLD AUTO: 12.8 K/UL (ref 4–11)

## 2024-08-21 PROCEDURE — 6370000000 HC RX 637 (ALT 250 FOR IP): Performed by: INTERNAL MEDICINE

## 2024-08-21 PROCEDURE — 85025 COMPLETE CBC W/AUTO DIFF WBC: CPT

## 2024-08-21 PROCEDURE — 83605 ASSAY OF LACTIC ACID: CPT

## 2024-08-21 PROCEDURE — 6360000002 HC RX W HCPCS: Performed by: INTERNAL MEDICINE

## 2024-08-21 PROCEDURE — 2580000003 HC RX 258: Performed by: STUDENT IN AN ORGANIZED HEALTH CARE EDUCATION/TRAINING PROGRAM

## 2024-08-21 PROCEDURE — 2580000003 HC RX 258: Performed by: INTERNAL MEDICINE

## 2024-08-21 PROCEDURE — 2500000003 HC RX 250 WO HCPCS: Performed by: INTERNAL MEDICINE

## 2024-08-21 PROCEDURE — 2000000000 HC ICU R&B

## 2024-08-21 PROCEDURE — 2500000003 HC RX 250 WO HCPCS: Performed by: STUDENT IN AN ORGANIZED HEALTH CARE EDUCATION/TRAINING PROGRAM

## 2024-08-21 PROCEDURE — 82803 BLOOD GASES ANY COMBINATION: CPT

## 2024-08-21 PROCEDURE — 2500000003 HC RX 250 WO HCPCS

## 2024-08-21 PROCEDURE — 99232 SBSQ HOSP IP/OBS MODERATE 35: CPT | Performed by: STUDENT IN AN ORGANIZED HEALTH CARE EDUCATION/TRAINING PROGRAM

## 2024-08-21 PROCEDURE — 94761 N-INVAS EAR/PLS OXIMETRY MLT: CPT

## 2024-08-21 PROCEDURE — 99291 CRITICAL CARE FIRST HOUR: CPT | Performed by: INTERNAL MEDICINE

## 2024-08-21 PROCEDURE — 94003 VENT MGMT INPAT SUBQ DAY: CPT

## 2024-08-21 PROCEDURE — 6360000002 HC RX W HCPCS: Performed by: STUDENT IN AN ORGANIZED HEALTH CARE EDUCATION/TRAINING PROGRAM

## 2024-08-21 PROCEDURE — 85520 HEPARIN ASSAY: CPT

## 2024-08-21 PROCEDURE — 5A0935A ASSISTANCE WITH RESPIRATORY VENTILATION, LESS THAN 24 CONSECUTIVE HOURS, HIGH NASAL FLOW/VELOCITY: ICD-10-PCS | Performed by: STUDENT IN AN ORGANIZED HEALTH CARE EDUCATION/TRAINING PROGRAM

## 2024-08-21 PROCEDURE — 2700000000 HC OXYGEN THERAPY PER DAY

## 2024-08-21 PROCEDURE — 84484 ASSAY OF TROPONIN QUANT: CPT

## 2024-08-21 PROCEDURE — 80076 HEPATIC FUNCTION PANEL: CPT

## 2024-08-21 PROCEDURE — 94660 CPAP INITIATION&MGMT: CPT

## 2024-08-21 PROCEDURE — 80048 BASIC METABOLIC PNL TOTAL CA: CPT

## 2024-08-21 RX ORDER — INSULIN LISPRO 100 [IU]/ML
0-8 INJECTION, SOLUTION INTRAVENOUS; SUBCUTANEOUS EVERY 4 HOURS
Status: DISCONTINUED | OUTPATIENT
Start: 2024-08-21 | End: 2024-08-22

## 2024-08-21 RX ORDER — LORAZEPAM 1 MG/1
3 TABLET ORAL
Status: DISCONTINUED | OUTPATIENT
Start: 2024-08-21 | End: 2024-08-22 | Stop reason: HOSPADM

## 2024-08-21 RX ORDER — DEXMEDETOMIDINE HYDROCHLORIDE 4 UG/ML
INJECTION, SOLUTION INTRAVENOUS
Status: COMPLETED
Start: 2024-08-21 | End: 2024-08-21

## 2024-08-21 RX ORDER — LORAZEPAM 1 MG/1
1 TABLET ORAL
Status: DISCONTINUED | OUTPATIENT
Start: 2024-08-21 | End: 2024-08-22 | Stop reason: HOSPADM

## 2024-08-21 RX ORDER — LORAZEPAM 1 MG/1
4 TABLET ORAL
Status: DISCONTINUED | OUTPATIENT
Start: 2024-08-21 | End: 2024-08-22 | Stop reason: HOSPADM

## 2024-08-21 RX ORDER — DEXMEDETOMIDINE HYDROCHLORIDE 4 UG/ML
.1-1.5 INJECTION, SOLUTION INTRAVENOUS CONTINUOUS
Status: DISCONTINUED | OUTPATIENT
Start: 2024-08-21 | End: 2024-08-22

## 2024-08-21 RX ORDER — GAUZE BANDAGE 2" X 2"
100 BANDAGE TOPICAL DAILY
Status: DISCONTINUED | OUTPATIENT
Start: 2024-08-21 | End: 2024-08-21

## 2024-08-21 RX ORDER — LORAZEPAM 2 MG/ML
INJECTION INTRAMUSCULAR
Status: DISPENSED
Start: 2024-08-21 | End: 2024-08-22

## 2024-08-21 RX ORDER — LORAZEPAM 1 MG/1
2 TABLET ORAL
Status: DISCONTINUED | OUTPATIENT
Start: 2024-08-21 | End: 2024-08-22 | Stop reason: HOSPADM

## 2024-08-21 RX ADMIN — SODIUM CHLORIDE, PRESERVATIVE FREE 10 ML: 5 INJECTION INTRAVENOUS at 10:14

## 2024-08-21 RX ADMIN — SODIUM CHLORIDE, PRESERVATIVE FREE 10 ML: 5 INJECTION INTRAVENOUS at 20:47

## 2024-08-21 RX ADMIN — Medication 125 MCG/HR: at 03:33

## 2024-08-21 RX ADMIN — Medication 2 MCG/MIN: at 15:12

## 2024-08-21 RX ADMIN — DEXMEDETOMIDINE HYDROCHLORIDE 1 MCG/KG/HR: 400 INJECTION, SOLUTION INTRAVENOUS at 10:15

## 2024-08-21 RX ADMIN — PROPOFOL 35 MCG/KG/MIN: 10 INJECTION, EMULSION INTRAVENOUS at 06:05

## 2024-08-21 RX ADMIN — SODIUM CHLORIDE 1 MG: 9 INJECTION INTRAMUSCULAR; INTRAVENOUS; SUBCUTANEOUS at 18:52

## 2024-08-21 RX ADMIN — HEPARIN SODIUM 2000 UNITS: 1000 INJECTION INTRAVENOUS; SUBCUTANEOUS at 06:16

## 2024-08-21 RX ADMIN — SODIUM CHLORIDE 2 MG: 9 INJECTION INTRAMUSCULAR; INTRAVENOUS; SUBCUTANEOUS at 09:48

## 2024-08-21 RX ADMIN — LORAZEPAM 1 MG: 1 TABLET ORAL at 12:48

## 2024-08-21 RX ADMIN — SODIUM CHLORIDE, PRESERVATIVE FREE 10 ML: 5 INJECTION INTRAVENOUS at 10:13

## 2024-08-21 RX ADMIN — HEPARIN SODIUM 2000 UNITS: 1000 INJECTION INTRAVENOUS; SUBCUTANEOUS at 01:23

## 2024-08-21 RX ADMIN — DEXMEDETOMIDINE HYDROCHLORIDE 0.5 MCG/KG/HR: 400 INJECTION, SOLUTION INTRAVENOUS at 18:35

## 2024-08-21 RX ADMIN — SODIUM CHLORIDE 1 MG: 9 INJECTION INTRAMUSCULAR; INTRAVENOUS; SUBCUTANEOUS at 20:46

## 2024-08-21 RX ADMIN — DEXMEDETOMIDINE HYDROCHLORIDE 0.2 MCG/KG/HR: 400 INJECTION, SOLUTION INTRAVENOUS at 10:33

## 2024-08-21 ASSESSMENT — PULMONARY FUNCTION TESTS
PIF_VALUE: 148
PIF_VALUE: 18
PIF_VALUE: 12

## 2024-08-21 ASSESSMENT — PAIN SCALES - GENERAL
PAINLEVEL_OUTOF10: 0
PAINLEVEL_OUTOF10: 0

## 2024-08-21 NOTE — PROGRESS NOTES
2mg Ativan given per CIWA score after extubation. Patient sleepy but responds to name. Oxygen saturations dropped to 83% on 4 L, placed on nonrebreather and notified RT. Dr. Ortiz aware. Bipap placed for now.

## 2024-08-21 NOTE — PROGRESS NOTES
Patient has been successfully weaned from Mechanical Ventilation.  RSBI before extubation was 30 with EtCO2 of 30 and SpO2 of 100 on 40% FiO2.  Patient extubated and placed on 3 liters/min via nasal cannula.  Post extubation SpO2 is 95% with HR  77 bpm and RR 16 breaths/min.  Patient had mild cough that was non-productive.  Extubation Well tolerated by patient..

## 2024-08-21 NOTE — CONSULTS
PULMONARY AND CRITICAL CARE MEDICINE CONSULTATION NOTE    CONSULTING PHYSICIAN:  Rickie Bran MD    ADMISSION DATE: 8/20/2024  ADMISSION DIAGNOSIS: Ventricular fibrillation (HCC) [I49.01]  Cardiac arrest (HCC) [I46.9]  Combative behavior [R46.89]  Cardiac arrest with ventricular fibrillation (HCC) [I46.9, I49.01]    REASON FOR CONSULT:   Chief Complaint   Patient presents with    Cardiac Arrest     Pt brought in per [facility] from home, per neighbor he heard pt collapse and hit the ground in the garage, EMS arrived and found the pt to be unresponsive in v-fib, pt shocked x 2 at 360 j, epi x 2, they obtained ROSC, pt combative after ROSC.  Pt arrived awake, yelling, cursing, combative, kicking.         DATE OF CONSULT: 8/20/2024    HISTORY OF PRESENT ILLNESS: 68 y.o. year old male with unknown past medical history had presented to the hospital after witnessed cardiac arrest in the field.  Apparently patient collapsed and was witnessed by one of his neighbors.  Prior to collapsing patient was yelling and screaming.  When EMS arrived patient was seen to be in V-fib cardiac arrest.  After about 12 minutes of CPR ROSC was achieved.  Patient was brought into the ER where he was agitated, combative and moving all his extremities.  He had to be sedated and intubated.  EKG showed  left bundle branch block.  Mild troponinemia was seen.  Lactic acidosis was seen.  Patient was suspected to have seizure in the field.  He does not follow with physicians on a regular basis.  Known to abuse alcohol.  Urine toxicology screen positive for cocaine and marijuana.  Patient remained on full mechanical ventilatory support overnight.  Sedated to maintain ventilator synchrony.    On my evaluation today, patient is still on full mechanical ventilatory support and sedated.  On tactile stimuli does arouse and becomes a little fidgety.  Not consistently following commands.  Remains on low-dose amiodarone and Levophed.  Also on  with the cardiologist.  Patient may need ischemic workup to rule out any culprit coronary artery disease.  Currently requiring very low-dose pressor support.  Hopefully when weaned off of sedation, should also come off of vasopressors.  Also on amiodarone drip for wide-complex tachycardia.  Need for mechanical ventilation  At the time of presentation patient was extremely agitated and combative.  He was sedated and then intubated.  This morning remains on full mechanical ventilatory support.  I was able to switch him to spontaneous breathing trial and he was tolerating it well.  ABG done this morning shows adequate gas exchange.  Will plan to extubate him to nasal cannula.  If respiratory distress seen, can provide him with BiPAP support.  Altered mental status  Could be related to alcohol withdrawal.  Patient is known to be abusing alcohol chronically.  Alcohol levels were negative at the time of admission.  Will keep on CIWA protocol  May have to start him on Precedex drip if agitation continues.  Phenobarbital can be used if extreme agitation seen.  Will provide him with thiamine 100 mg p.o. daily.  Patient was not a candidate for therapeutic hypothermia postcardiac arrest as his GCS was more than 3T.  Acute kidney injury  Most likely due to cardiac arrest  Did get IV fluids.  Closely monitor urine output  Polysubstance abuse  Known to abuse alcohol, urine toxicology screen also positive for cocaine and cannabis  May need extensive counseling to stop using illicit drugs.    DVT prophylaxis:   Heparin  GI prophylaxis   Protonix  Code Status   Full code  Plan of Care   As above    Total critical care time caring for this patient with life threatening illness, including direct patient contact, management of life support systems, review of data including imaging and labs, discussions with other team members and physicians is at least 38 minutes so far today, excluding procedures.        Willard Ortiz MD, [facility]  [place]

## 2024-08-21 NOTE — PROGRESS NOTES
08/21/24 0826   Vent Patient Data (Readings)   Plateau Pressure (cm H2O) 15 cm H2O   Driving Pressure 10   I:E Ratio 1:2.3   Static Compliance (L/cm H2O) 51   Dynamic Compliance (L/cm H2O) 39 L/cm H2O

## 2024-08-21 NOTE — PROGRESS NOTES
[facility] [place] Daily Progress Note      Admit Date:  8/20/2024    Chief Complaint:  cardiac arrest     Subjective:  Mr. Rodriguez was extubated this AM. He was reportedly appropriate at one point but very irritated and wishing to leave. He later was more hypoxic and placed on bipap. Discussed with his sister. She is very doubtful that he would remain compliant with med Rx. Discussed harm of placing stents with DAPT noncompliance. She agrees this would be a concern    Objective:   /73   Pulse 67   Temp 97.4 °F (36.3 °C) (Temporal)   Resp 17   Ht 1.778 m (5' 10\")   Wt 82.7 kg (182 lb 5.1 oz)   SpO2 100%   BMI 26.16 kg/m²     Intake/Output Summary (Last 24 hours) at 8/21/2024 1304  Last data filed at 8/21/2024 1031  Gross per 24 hour   Intake 1437.98 ml   Output 660 ml   Net 777.98 ml       Physical Exam:  General:  Somnolent on precedex and bipap  Skin:  Warm and dry  Neck:  JVD not visualized sitting upright  Chest:  Bipap, crackles  Cardiovascular:  RRR S1S2, no S3, No murmur  Abdomen:  Soft, ND, NT, No HSM  Extremities:  No edema    Medications:    insulin lispro  0-8 Units SubCUTAneous Q4H    pantoprazole (PROTONIX) 40 mg in sodium chloride (PF) 0.9 % 10 mL injection  40 mg IntraVENous Daily    aspirin  81 mg Oral Daily    sodium chloride flush  5-40 mL IntraVENous 2 times per day    thiamine mononitrate  100 mg Oral Daily    sodium chloride flush  5-40 mL IntraVENous 2 times per day    lidocaine 1 % injection  50 mg IntraDERmal Once      dexmedeTOMIDine 0.2 mcg/kg/hr (08/21/24 1033)    amiodarone 0.5 mg/min (08/21/24 1031)    propofol 30 mcg/kg/min (08/21/24 0758)    fentaNYL Stopped (08/21/24 0934)    sodium chloride      sodium chloride      norepinephrine 3 mcg/min (08/21/24 1031)    heparin (PORCINE) Infusion 15 Units/kg/hr (08/21/24 1031)    dextrose       LORazepam **OR** LORazepam **OR** LORazepam **OR** LORazepam **OR** LORazepam **OR** LORazepam **OR** LORazepam **OR** LORazepam,  perflutren lipid microspheres, sodium chloride flush, sodium chloride, polyethylene glycol, acetaminophen **OR** acetaminophen, sodium chloride flush, sodium chloride, heparin (porcine), heparin (porcine), dextrose bolus **OR** dextrose bolus, glucagon (rDNA), dextrose    TELEMETRY (Personally reviewed by me): Sinus     Lab Data:  CBC:   Recent Labs     08/20/24  1320 08/20/24  1615 08/21/24  0535   WBC 17.2* 22.7* 12.8*   HGB 17.2 15.6 14.1   HCT 54.9* 47.3 41.7   MCV 97.5 90.5 90.5    272 263     BMP:   Recent Labs     08/20/24  1320 08/20/24  1658 08/21/24  0535   * 136 135*   K 4.9 4.3 4.1   CL 97* 100 102   CO2 12* 19* 23   BUN 26* 32* 33*   CREATININE 1.5* 1.6* 1.6*     LIVER PROFILE:   Recent Labs     08/20/24  1320 08/20/24  1658 08/21/24  0535   AST 71* 53* 47*   ALT 35 30 27   LIPASE  --  22.0  --    BILIDIR  --   --  0.2   BILITOT 1.0 0.5 0.5   ALKPHOS 154* 125 106     PT/INR:   Recent Labs     08/20/24  1320 08/20/24  1836   PROTIME 16.1* 15.2*   INR 1.27* 1.18*     APTT:   Recent Labs     08/20/24  1836   APTT 36.1     BNP:  No results for input(s): \"BNP\" in the last 72 hours.    Imaging/Procedures:     Limited Echo 8/20/24     Very limited echo    Left Ventricle: Severely reduced left ventricular systolic function with a visually estimated EF of 25 - 30%. EF by 2D Simpsons Biplane is 32%. Left ventricle is dilated. Normal wall thickness. Severe global hypokinesis present with abnormal septal wall motion.    Aortic Valve: Mild regurgitation.    Pericardium: No pericardial effusion.    Critical finding of severely reduced cardiac function relayed to Dr. Cummings      CT head 8/20/24  IMPRESSION:  1. No acute intracranial abnormality.    Assessment/Plan:  Vfib arrest per EMS. 12+ mins CPR. S/p Epi and defib  Severe lactic acidosis, pH 6.8 and lactate of 24 on admit now 2.6  LBBB  Cardiomyopathy EF 25-30%, global in setting of arrest  Hx Seizures  Alcohol abuse     He is extubated today, but

## 2024-08-21 NOTE — PROGRESS NOTES
Hospitalist Progress Note    Name:  Juan Rodriguez    /Age/Sex: 1955  (68 y.o. male)  MRN & CSN:  [id number] & [id number]    PCP: No primary care provider on file.    Date of Admission: 2024    Patient Status:  Inpatient     Chief Complaint:   Chief Complaint   Patient presents with    Cardiac Arrest     Pt brought in per [facility] from home, per neighbor he heard pt collapse and hit the ground in the garage, EMS arrived and found the pt to be unresponsive in v-fib, pt shocked x 2 at 360 j, epi x 2, they obtained ROSC, pt combative after ROSC.  Pt arrived awake, yelling, cursing, combative, kicking.         Hospital Course:   Juan Rodriguez is a 68 y.o. male who had neighbors call when the patient fell down while working out in his car he did not receive bystander CPR EMS arrived and found the patient to be responsive in V-fib patient received chest comfortably and defibrillation x 2 with epi x 2 was changed ROSC roughly in 12 minutes patient was very combative upon obtaining ROSC noncooperative agitated EKG on arrival did show wide-complex tachycardia and patient was given a cardioversion patient required sedation intubation due to severe agitation had a CT head which was negative for intracranial hemorrhage.  Family was in the room and saw the patient patient drinks 10+ beers every day does not smoke has not seen a doctor in many years no prior medical history per the family P     Subjective:  Today is:  Hospital Day: 2.  Patient seen and examined in CVU-/2911.     Patient was intubated and sedated at the time of my evaluation but was able to be easily awakened and followed some commands.  We were able to have him extubated      Medications:  Reviewed    Infusion Medications    dexmedeTOMIDine Stopped (24 1214)    amiodarone Stopped (24 1205)    propofol 30 mcg/kg/min (24 1308)    fentaNYL Stopped (24 0927)    sodium chloride      sodium chloride      norepinephrine 2  mcg/min (08/21/24 1512)    heparin (PORCINE) Infusion 17 Units/kg/hr (08/21/24 1348)    dextrose       Scheduled Medications    insulin lispro  0-8 Units SubCUTAneous Q4H    pantoprazole (PROTONIX) 40 mg in sodium chloride (PF) 0.9 % 10 mL injection  40 mg IntraVENous Daily    aspirin  81 mg Oral Daily    sodium chloride flush  5-40 mL IntraVENous 2 times per day    thiamine mononitrate  100 mg Oral Daily    sodium chloride flush  5-40 mL IntraVENous 2 times per day    lidocaine 1 % injection  50 mg IntraDERmal Once     PRN Meds: LORazepam **OR** LORazepam **OR** LORazepam **OR** LORazepam **OR** LORazepam **OR** LORazepam **OR** LORazepam **OR** LORazepam, perflutren lipid microspheres, sodium chloride flush, sodium chloride, polyethylene glycol, acetaminophen **OR** acetaminophen, sodium chloride flush, sodium chloride, heparin (porcine), heparin (porcine), dextrose bolus **OR** dextrose bolus, glucagon (rDNA), dextrose      Intake/Output Summary (Last 24 hours) at 8/21/2024 1513  Last data filed at 8/21/2024 1348  Gross per 24 hour   Intake 1520.72 ml   Output 1210 ml   Net 310.72 ml       Physical Exam Performed:    /63   Pulse 53   Temp 97.8 °F (36.6 °C) (Temporal)   Resp 17   Ht 1.778 m (5' 10\")   Wt 82.7 kg (182 lb 5.1 oz)   SpO2 100%   BMI 26.16 kg/m²     General appearance: No apparent distress, easily awakened on event and is following some commands  HEENT: Pupils equal, round, and reactive to light. Conjunctivae/corneas clear.  Neck: Supple, with full range of motion. No jugular venous distention. Trachea midline.  Respiratory:  Normal respiratory effort. Clear to auscultation, bilaterally without Rales/Wheezes/Rhonchi.  Cardiovascular: Regular rate and rhythm with normal S1/S2 without murmurs, rubs or gallops. No peripheral edema.  Abdomen: Soft, non-tender, non-distended with normal bowel sounds.  : No CVA tenderness.  Musculoskeletal: No clubbing or cyanosis.  Full range of motion

## 2024-08-22 VITALS
HEIGHT: 70 IN | OXYGEN SATURATION: 93 % | SYSTOLIC BLOOD PRESSURE: 111 MMHG | TEMPERATURE: 97.6 F | HEART RATE: 59 BPM | WEIGHT: 181.66 LBS | BODY MASS INDEX: 26.01 KG/M2 | RESPIRATION RATE: 18 BRPM | DIASTOLIC BLOOD PRESSURE: 66 MMHG

## 2024-08-22 LAB
ALBUMIN SERPL-MCNC: 3.5 G/DL (ref 3.4–5)
ALP SERPL-CCNC: 87 U/L (ref 40–129)
ALT SERPL-CCNC: 29 U/L (ref 10–40)
ANION GAP SERPL CALCULATED.3IONS-SCNC: 12 MMOL/L (ref 3–16)
ANTI-XA UNFRAC HEPARIN: 0.26 IU/ML (ref 0.3–0.7)
ANTI-XA UNFRAC HEPARIN: 0.3 IU/ML (ref 0.3–0.7)
AST SERPL-CCNC: 54 U/L (ref 15–37)
BASOPHILS # BLD: 0 K/UL (ref 0–0.2)
BASOPHILS NFR BLD: 0.4 %
BILIRUB DIRECT SERPL-MCNC: 0.2 MG/DL (ref 0–0.3)
BILIRUB INDIRECT SERPL-MCNC: 0.5 MG/DL (ref 0–1)
BILIRUB SERPL-MCNC: 0.7 MG/DL (ref 0–1)
BUN SERPL-MCNC: 28 MG/DL (ref 7–20)
CALCIUM SERPL-MCNC: 8.8 MG/DL (ref 8.3–10.6)
CHLORIDE SERPL-SCNC: 103 MMOL/L (ref 99–110)
CO2 SERPL-SCNC: 22 MMOL/L (ref 21–32)
CREAT SERPL-MCNC: 1.1 MG/DL (ref 0.8–1.3)
DEPRECATED RDW RBC AUTO: 13.3 % (ref 12.4–15.4)
EOSINOPHIL # BLD: 0.1 K/UL (ref 0–0.6)
EOSINOPHIL NFR BLD: 1.8 %
GFR SERPLBLD CREATININE-BSD FMLA CKD-EPI: 74 ML/MIN/{1.73_M2}
GLUCOSE BLD-MCNC: 84 MG/DL (ref 70–99)
GLUCOSE SERPL-MCNC: 91 MG/DL (ref 70–99)
HCT VFR BLD AUTO: 38.5 % (ref 40.5–52.5)
HGB BLD-MCNC: 13.5 G/DL (ref 13.5–17.5)
LYMPHOCYTES # BLD: 1.1 K/UL (ref 1–5.1)
LYMPHOCYTES NFR BLD: 14.1 %
MCH RBC QN AUTO: 31.6 PG (ref 26–34)
MCHC RBC AUTO-ENTMCNC: 35.2 G/DL (ref 31–36)
MCV RBC AUTO: 89.8 FL (ref 80–100)
MONOCYTES # BLD: 1 K/UL (ref 0–1.3)
MONOCYTES NFR BLD: 12.2 %
NEUTROPHILS # BLD: 5.7 K/UL (ref 1.7–7.7)
NEUTROPHILS NFR BLD: 71.5 %
PERFORMED ON: NORMAL
PLATELET # BLD AUTO: 170 K/UL (ref 135–450)
PMV BLD AUTO: 7.4 FL (ref 5–10.5)
POTASSIUM SERPL-SCNC: 4.1 MMOL/L (ref 3.5–5.1)
PROT SERPL-MCNC: 6.8 G/DL (ref 6.4–8.2)
RBC # BLD AUTO: 4.28 M/UL (ref 4.2–5.9)
SODIUM SERPL-SCNC: 137 MMOL/L (ref 136–145)
WBC # BLD AUTO: 8 K/UL (ref 4–11)

## 2024-08-22 PROCEDURE — 99233 SBSQ HOSP IP/OBS HIGH 50: CPT | Performed by: INTERNAL MEDICINE

## 2024-08-22 PROCEDURE — 6360000002 HC RX W HCPCS: Performed by: INTERNAL MEDICINE

## 2024-08-22 PROCEDURE — 94660 CPAP INITIATION&MGMT: CPT

## 2024-08-22 PROCEDURE — 85520 HEPARIN ASSAY: CPT

## 2024-08-22 PROCEDURE — 80076 HEPATIC FUNCTION PANEL: CPT

## 2024-08-22 PROCEDURE — 80048 BASIC METABOLIC PNL TOTAL CA: CPT

## 2024-08-22 PROCEDURE — 5A09357 ASSISTANCE WITH RESPIRATORY VENTILATION, LESS THAN 24 CONSECUTIVE HOURS, CONTINUOUS POSITIVE AIRWAY PRESSURE: ICD-10-PCS | Performed by: STUDENT IN AN ORGANIZED HEALTH CARE EDUCATION/TRAINING PROGRAM

## 2024-08-22 PROCEDURE — 2580000003 HC RX 258: Performed by: INTERNAL MEDICINE

## 2024-08-22 PROCEDURE — 6370000000 HC RX 637 (ALT 250 FOR IP): Performed by: STUDENT IN AN ORGANIZED HEALTH CARE EDUCATION/TRAINING PROGRAM

## 2024-08-22 PROCEDURE — 6370000000 HC RX 637 (ALT 250 FOR IP): Performed by: INTERNAL MEDICINE

## 2024-08-22 PROCEDURE — 6360000002 HC RX W HCPCS: Performed by: STUDENT IN AN ORGANIZED HEALTH CARE EDUCATION/TRAINING PROGRAM

## 2024-08-22 PROCEDURE — 85025 COMPLETE CBC W/AUTO DIFF WBC: CPT

## 2024-08-22 PROCEDURE — 2500000003 HC RX 250 WO HCPCS: Performed by: INTERNAL MEDICINE

## 2024-08-22 PROCEDURE — 2580000003 HC RX 258: Performed by: STUDENT IN AN ORGANIZED HEALTH CARE EDUCATION/TRAINING PROGRAM

## 2024-08-22 PROCEDURE — 2700000000 HC OXYGEN THERAPY PER DAY

## 2024-08-22 RX ADMIN — DEXMEDETOMIDINE HYDROCHLORIDE 0.5 MCG/KG/HR: 400 INJECTION, SOLUTION INTRAVENOUS at 02:44

## 2024-08-22 RX ADMIN — HEPARIN SODIUM 19 UNITS/KG/HR: 10000 INJECTION, SOLUTION INTRAVENOUS at 09:32

## 2024-08-22 RX ADMIN — SODIUM CHLORIDE 1 MG: 9 INJECTION INTRAMUSCULAR; INTRAVENOUS; SUBCUTANEOUS at 02:39

## 2024-08-22 RX ADMIN — ASPIRIN 81 MG: 81 TABLET, CHEWABLE ORAL at 07:59

## 2024-08-22 RX ADMIN — SODIUM CHLORIDE, PRESERVATIVE FREE 10 ML: 5 INJECTION INTRAVENOUS at 07:59

## 2024-08-22 RX ADMIN — PANTOPRAZOLE SODIUM 40 MG: 40 INJECTION, POWDER, FOR SOLUTION INTRAVENOUS at 07:59

## 2024-08-22 RX ADMIN — Medication 100 MG: at 07:59

## 2024-08-22 ASSESSMENT — PAIN SCALES - GENERAL: PAINLEVEL_OUTOF10: 0

## 2024-08-22 NOTE — PROGRESS NOTES
PULMONARY AND CRITICAL CARE MEDICINE PROGRESS NOTE    Subjective: Patient sitting in chair in no apparent respite distress.  Extubated yesterday and tolerated well.  Now on room air.  Hemodynamically stable.  Refusing any cardiac testing.  Wants to go back home.    REVIEW OF SYSTEMS:   8 point review of system is negative except that mentioned the subjective portion.      PAST MEDICAL HISTORY: History reviewed. No pertinent past medical history.    PAST SURGICAL HISTORY: History reviewed. No pertinent surgical history.    SOCIAL HISTORY:   Social History     Tobacco Use    Smoking status: Never    Smokeless tobacco: Current     Types: Chew   Vaping Use    Vaping status: Never Used   Substance Use Topics    Alcohol use: Yes     Alcohol/week: 2.0 standard drinks of alcohol     Types: 2 Cans of beer per week     Comment: daily    Drug use: Yes     Types: Marijuana (Weed)       FAMILY HISTORY:   Family History   Problem Relation Age of Onset    High Blood Pressure Mother     Other Mother         CHF    High Blood Pressure Father     Cancer Father         lung       MEDICATIONS:     No current facility-administered medications on file prior to encounter.     No current outpatient medications on file prior to encounter.        pantoprazole (PROTONIX) 40 mg in sodium chloride (PF) 0.9 % 10 mL injection  40 mg IntraVENous Daily    aspirin  81 mg Oral Daily    sodium chloride flush  5-40 mL IntraVENous 2 times per day    thiamine mononitrate  100 mg Oral Daily    sodium chloride flush  5-40 mL IntraVENous 2 times per day      sodium chloride      sodium chloride      norepinephrine Stopped (08/22/24 0443)    heparin (PORCINE) Infusion 19 Units/kg/hr (08/22/24 0932)    dextrose       LORazepam **OR** LORazepam **OR** LORazepam **OR** LORazepam **OR** LORazepam **OR** LORazepam **OR** LORazepam **OR** LORazepam, perflutren lipid microspheres, sodium chloride flush, sodium chloride, polyethylene glycol, acetaminophen  **OR** acetaminophen, sodium chloride flush, sodium chloride, heparin (porcine), heparin (porcine), dextrose bolus **OR** dextrose bolus, glucagon (rDNA), dextrose      ALLERGIES:   Allergies as of 08/20/2024    (No Known Allergies)      OBJECTIVE:   height is 1.778 m (5' 10\") and weight is 82.4 kg (181 lb 10.5 oz). His temporal temperature is 97.6 °F (36.4 °C). His blood pressure is 111/66 and his pulse is 59. His respiration is 18 and oxygen saturation is 93%.   No intake/output data recorded.     PHYSICAL EXAM:    CONSTITUTIONAL: He is a 68 y.o.-year-old who appears his stated age. He is awake and alert, oriented x 3.  HEENT: PERRLA, EOMI. No scleral icterus. No thrush, atraumatic, normocephalic.  NECK: Supple, without cervical or supraclavicular lymphadenopathy:  CARDIOVASCULAR: S1 S2 RRR. Without murmer. No JVD or hepatojugular reflux seen.   RESPIRATORY & CHEST: Lung sounds are equal on both sides.  No wheezing or crackles heard.  GASTROINTESTINAL & ABDOMEN: Soft, nontender, positive bowel sounds in all quadrants, non-distended, without hepatosplenomegaly.   GENITOURINARY: No gross anatomical abnormalities seen.   MUSCULOSKELETAL: No tenderness to palpation of the axial skeleton. There is no clubbing. No cyanosis. No edema of the lower extremities.   SKIN OF BODY: No rash or jaundice.   PSYCHIATRIC EVALUATION: Able to assess.  HEMATOLOGIC/LYMPHATIC/ IMMUNOLOGIC: No palpable lymphadenopathy.  NEUROLOGIC: Awake, alert and oriented x 3.  Grossly nonfocal.     LABS:  Recent Labs     08/20/24  1320 08/20/24  1615 08/20/24  1658 08/20/24  1836 08/21/24  0535 08/22/24  0543   WBC 17.2* 22.7*  --   --  12.8* 8.0   HGB 17.2 15.6  --   --  14.1 13.5   HCT 54.9* 47.3  --   --  41.7 38.5*    272  --   --  263 170   ALT 35  --  30  --  27 29   AST 71*  --  53*  --  47* 54*   *  --  136  --  135* 137   K 4.9  --  4.3  --  4.1 4.1   CL 97*  --  100  --  102 103   CREATININE 1.5*  --  1.6*  --  1.6* 1.1   BUN 26*

## 2024-08-22 NOTE — PLAN OF CARE
Problem: Skin/Tissue Integrity  Goal: Absence of new skin breakdown  Description: 1.  Monitor for areas of redness and/or skin breakdown  2.  Assess vascular access sites hourly  3.  Every 4-6 hours minimum:  Change oxygen saturation probe site  4.  Every 4-6 hours:  If on nasal continuous positive airway pressure, respiratory therapy assess nares and determine need for appliance change or resting period.  Outcome: Progressing     Problem: Safety - Adult  Goal: Free from fall injury  Outcome: Progressing     Problem: Pain  Goal: Verbalizes/displays adequate comfort level or baseline comfort level  Outcome: Progressing     Problem: Neurosensory - Adult  Goal: Achieves stable or improved neurological status  Outcome: Progressing  Goal: Achieves maximal functionality and self care  Outcome: Progressing     Problem: Cardiovascular - Adult  Goal: Maintains optimal cardiac output and hemodynamic stability  Outcome: Progressing  Goal: Absence of cardiac dysrhythmias or at baseline  Outcome: Progressing     Problem: Metabolic/Fluid and Electrolytes - Adult  Goal: Electrolytes maintained within normal limits  Outcome: Progressing  Goal: Hemodynamic stability and optimal renal function maintained  Outcome: Progressing     Problem: Confusion  Goal: Confusion, delirium, dementia, or psychosis is improved or at baseline  Description: INTERVENTIONS:  1. Assess for possible contributors to thought disturbance, including medications, impaired vision or hearing, underlying metabolic abnormalities, dehydration, psychiatric diagnoses, and notify attending LIP  2. [place] high risk fall precautions, as indicated  3. Provide frequent short contacts to provide reality reorientation, refocusing and direction  4. Decrease environmental stimuli, including noise as appropriate  5. Monitor and intervene to maintain adequate nutrition, hydration, elimination, sleep and activity  6. If unable to ensure safety without constant attention

## 2024-08-22 NOTE — PROGRESS NOTES
[facility] Daily Progress Note      Admit Date:  8/20/2024    Chief Complaint:  cardiac arrest     Subjective:  Mr. Rodriguez is awake and alert today. Still on low dose Precedex. We discussed events that took place over last 48h. I attempted to gather further info on his social history. He denies any illicit drug use. Dicussed UDS findings. He denies. Also attempted to talk about his alcohol use and last drink. Explained importance that he be honest as there is concern this led to his arrest. He also denies any alcohol use beyond 1 beer per day and that he frequently goes days without drinking. His family explained otherwise. I also discussed the importance of coronary evaluation and that I cannot rule out coronary etiology of arrest without [facility]. He is not at all interested. Adamantly declines and states he is leaving. Denies any symptoms.     Objective:   /66   Pulse 59   Temp 97.6 °F (36.4 °C) (Temporal)   Resp 18   Ht 1.778 m (5' 10\")   Wt 82.4 kg (181 lb 10.5 oz)   SpO2 93%   BMI 26.07 kg/m²     Intake/Output Summary (Last 24 hours) at 8/22/2024 1131  Last data filed at 8/22/2024 0800  Gross per 24 hour   Intake 453.96 ml   Output 400 ml   Net 53.96 ml       Physical Exam:  General:  Alert, agitated but conversational  Skin:  Warm and dry  Neck:  JVD not visualized sitting upright  Chest:  Clear, normal effort  Cardiovascular:  RRR S1S2, no S3, No murmur  Abdomen:  Soft, ND, NT, No HSM  Extremities:  No edema    Medications:    pantoprazole (PROTONIX) 40 mg in sodium chloride (PF) 0.9 % 10 mL injection  40 mg IntraVENous Daily    aspirin  81 mg Oral Daily    sodium chloride flush  5-40 mL IntraVENous 2 times per day    thiamine mononitrate  100 mg Oral Daily    sodium chloride flush  5-40 mL IntraVENous 2 times per day      sodium chloride      sodium chloride      norepinephrine Stopped (08/22/24 0313)    heparin (PORCINE) Infusion 19 Units/kg/hr (08/22/24 0932)    dextrose        LORazepam **OR** LORazepam **OR** LORazepam **OR** LORazepam **OR** LORazepam **OR** LORazepam **OR** LORazepam **OR** LORazepam, perflutren lipid microspheres, sodium chloride flush, sodium chloride, polyethylene glycol, acetaminophen **OR** acetaminophen, sodium chloride flush, sodium chloride, heparin (porcine), heparin (porcine), dextrose bolus **OR** dextrose bolus, glucagon (rDNA), dextrose    TELEMETRY (Personally reviewed by me): Sinus     Lab Data:  CBC:   Recent Labs     08/20/24  1615 08/21/24  0535 08/22/24  0543   WBC 22.7* 12.8* 8.0   HGB 15.6 14.1 13.5   HCT 47.3 41.7 38.5*   MCV 90.5 90.5 89.8    263 170     BMP:   Recent Labs     08/20/24  1658 08/21/24  0535 08/22/24  0543    135* 137   K 4.3 4.1 4.1    102 103   CO2 19* 23 22   BUN 32* 33* 28*   CREATININE 1.6* 1.6* 1.1     LIVER PROFILE:   Recent Labs     08/20/24  1658 08/21/24  0535 08/22/24  0543   AST 53* 47* 54*   ALT 30 27 29   LIPASE 22.0  --   --    BILIDIR  --  0.2 0.2   BILITOT 0.5 0.5 0.7   ALKPHOS 125 106 87     PT/INR:   Recent Labs     08/20/24  1320 08/20/24  1836   PROTIME 16.1* 15.2*   INR 1.27* 1.18*     APTT:   Recent Labs     08/20/24  1836   APTT 36.1     BNP:  No results for input(s): \"BNP\" in the last 72 hours.    Imaging/Procedures:     Limited Echo 8/20/24     Very limited echo    Left Ventricle: Severely reduced left ventricular systolic function with a visually estimated EF of 25 - 30%. EF by 2D Simpsons Biplane is 32%. Left ventricle is dilated. Normal wall thickness. Severe global hypokinesis present with abnormal septal wall motion.    Aortic Valve: Mild regurgitation.    Pericardium: No pericardial effusion.    Critical finding of severely reduced cardiac function relayed to Dr. Cummings    CT head 8/20/24  IMPRESSION:  1. No acute intracranial abnormality.    Assessment/Plan:  Vfib arrest per EMS. 12+ mins CPR. S/p Epi and defib  Severe lactic acidosis, pH 6.8 and lactate of 24 on admit now